# Patient Record
Sex: FEMALE | Race: WHITE
[De-identification: names, ages, dates, MRNs, and addresses within clinical notes are randomized per-mention and may not be internally consistent; named-entity substitution may affect disease eponyms.]

---

## 2019-12-27 ENCOUNTER — HOSPITAL ENCOUNTER (OUTPATIENT)
Dept: HOSPITAL 35 - CV | Age: 74
End: 2019-12-27
Payer: COMMERCIAL

## 2019-12-27 DIAGNOSIS — Q25.46: ICD-10-CM

## 2019-12-27 DIAGNOSIS — R06.02: Primary | ICD-10-CM

## 2019-12-27 DIAGNOSIS — I77.810: ICD-10-CM

## 2019-12-27 DIAGNOSIS — R06.00: ICD-10-CM

## 2019-12-27 NOTE — EXE
[facility]
[address]
[place]  [address]
Phone:  [phone]                    STRESS ECHOCARDIOGRAM         
_______________________________________________________________________________
 
Name:            MILLA GILBERT               Room #:                    [facility]#:           [id number]          Account #:     [id number]  
Admission:       12/27/19         Attend Phys:   Waylon Escobar, 
Discharge:                  Date of Birth: [birth year]  
                         Report #:      [id number]
        [id number]
_______________________________________________________________________________
THIS REPORT FOR:   //name//                          
 
 
--------------- APPROVED REPORT --------------
 
 
Study performed:  12/27/2019 11:08:08
 
Exam:  Stress Echocardiogram
Indication: Dyspnea
Patient Location: Out-Patient
Stress Nurse: Milla Gregorio RN
Status: routine
 
Ht: 5 ft 3 in      
HR: 52 bpm       BP: 138/74 mmHg 
Rhythm: NSR    
 
Medical History
Medications: No cardia meds
Allergies: No known drug allergies
 
Procedure
The patient underwent an Exercise Stress Test using the Del 
Protocol. Blood pressure, heart rate, and EKG were monitored.
An Echocardiogram was performed by technician in four stages in quad 
fashion.  At peak stress, four selected images were obtained and 
placed side by side with resting images for comparison.
 
Stress Test Details
Stress Test:  Exercise stress testing was performed using a Del 
protocol.      
HR           
Resting HR:            52 bpm   Max Heart Rate (APMHR): 147 bpm  
Max HR Achieved:  153 bpm   Target HR (85% APMHR): 124 bpm  
% of APMHR:         104         
Recovery HR:            65 bpm        
HR response to stress: Normal HR response to stress      
 
BP           
Resting BP:  138/74 mmHg        
Max BP:       146/72 mmHg        
Recovery BP:       126/70 mmHg        
BP response to stress: Normal blood pressure response to 
stress.      
ECG           
 
 
[address]  [address]
Phone:  [phone]                    STRESS ECHOCARDIOGRAM         
_______________________________________________________________________________
 
Name:            MILLA GILBERT FRANCISCO               Room #:                    [facility]#:           [id number]          Account #:     [id number]  
Admission:       12/27/19         Attend Phys:   Waylon Escobar, 
Discharge:                  Date of Birth: [birth year]  
                         Report #:      [id number]
        [id number]
_______________________________________________________________________________
Resting ECG:  Sinus Rhythm        
Stress ECG:     Sinus Tachycardia       
Recovery ECG: Sinus Rhythm        
 
Clinical
Reason for Termination: Maximal effort
Stress Symptoms: Dyspnea
Exercise duration: 8 min 43 sec
Highest Stage Achieved: Stage 3: 3.4 mph at 14% grade. 
Exercise capacity: 10.40 METs
 
Stress ECG Conclusion
1. subjectively negative for ischemia
2. electrocardiographically negative for ischemia
3. average functional capacity
 
Pre-Stress Echo
The resting Echocardiogram showed normal left ventricular 
contractility with an estimated Ejection Fraction of about 55-60%. 
The resting echocardiogram demonstrated normal wall motion in all 
wall segments.  
 
Post-Stress Echo
The stress Echocardiogram showed normal left ventricular 
contractility with an estimated Ejection Fraction of about 65-70%. 
Compared to rest, there were no stress-induced wall motion 
abnormalities. 
 
Conclusion
Clinical Response:  Non-ischemic
Exercise Capacity:  Average
Stress ECG Response:  Non-ischemic
Stress Echo Images:  Non-ischemic
1. low risk study
 
 
Other Information
Study Quality: Fair
Technically limited study due to breast 
implants.
 
 
 
[address] [place]  [address]
Phone:  [phone]                    STRESS ECHOCARDIOGRAM         
_______________________________________________________________________________
 
Name:            MILLA GILBERT               Room #:                    [facility]#:           [id number]          Account #:     [id number]  
Admission:       12/27/19         Attend Phys:   Waylon Escobar, 
Discharge:                  Date of Birth: [birth year]  
                         Report #:      [id number]
        [id number]
_______________________________________________________________________________
<Conclusion>
1. low risk study
 
 
 
 
 
 
 
 
 
 
 
 
 
 
 
 
 
 
 
 
 
 
 
 
 
 
 
 
 
 
 
 
 
 
 
 
 
 
 
 
 
 
  <ELECTRONICALLY SIGNED>
   By: Ezra Francisco MD    
  12/27/19     1534
D: 12/27/19 1534                           _____________________________________
T: 12/27/19 1534                           Ezra Francisco MD      /INF

## 2021-09-01 ENCOUNTER — HOSPITAL ENCOUNTER (OUTPATIENT)
Dept: HOSPITAL 35 - HYPER | Age: 76
End: 2021-09-01
Attending: PREVENTIVE MEDICINE
Payer: COMMERCIAL

## 2021-09-01 DIAGNOSIS — R23.3: ICD-10-CM

## 2021-09-01 DIAGNOSIS — S81.811A: Primary | ICD-10-CM

## 2021-09-01 DIAGNOSIS — G43.909: ICD-10-CM

## 2021-09-01 DIAGNOSIS — F41.9: ICD-10-CM
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
## 2021-09-01 DIAGNOSIS — L97.812: ICD-10-CM
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
## 2021-09-01 DIAGNOSIS — I83.811: ICD-10-CM

## 2021-09-01 DIAGNOSIS — L03.116: ICD-10-CM
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
## 2021-09-01 DIAGNOSIS — Z85.3: ICD-10-CM

## 2021-09-01 DIAGNOSIS — I83.028: ICD-10-CM

## 2021-09-09 ENCOUNTER — HOSPITAL ENCOUNTER (OUTPATIENT)
Dept: HOSPITAL 35 - HYPER | Age: 76
End: 2021-09-09
Attending: PREVENTIVE MEDICINE
Payer: COMMERCIAL

## 2021-09-09 DIAGNOSIS — Z85.3: ICD-10-CM

## 2021-09-09 DIAGNOSIS — L97.812: ICD-10-CM

## 2021-09-09 DIAGNOSIS — Z79.01: ICD-10-CM
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
## 2021-09-09 DIAGNOSIS — R23.3: ICD-10-CM

## 2021-09-09 DIAGNOSIS — L03.116: ICD-10-CM

## 2021-09-09 DIAGNOSIS — I83.812: ICD-10-CM

## 2021-09-09 DIAGNOSIS — F41.9: ICD-10-CM

## 2021-09-09 DIAGNOSIS — G43.909: ICD-10-CM

## 2021-09-09 DIAGNOSIS — Z79.899: ICD-10-CM

## 2021-09-09 DIAGNOSIS — I83.018: Primary | ICD-10-CM

## 2021-09-09 DIAGNOSIS — S81.811D: ICD-10-CM

## 2021-09-15 ENCOUNTER — HOSPITAL ENCOUNTER (OUTPATIENT)
Dept: HOSPITAL 35 - HYPER | Age: 76
End: 2021-09-15
Attending: PREVENTIVE MEDICINE
Payer: COMMERCIAL

## 2021-09-15 DIAGNOSIS — I83.018: Primary | ICD-10-CM

## 2021-09-15 DIAGNOSIS — F41.9: ICD-10-CM

## 2021-09-15 DIAGNOSIS — S81.811D: ICD-10-CM
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
## 2021-09-15 DIAGNOSIS — G43.909: ICD-10-CM

## 2021-09-15 DIAGNOSIS — L97.812: ICD-10-CM

## 2021-09-15 DIAGNOSIS — R23.3: ICD-10-CM

## 2021-09-15 DIAGNOSIS — Z85.3: ICD-10-CM

## 2021-09-15 DIAGNOSIS — L03.116: ICD-10-CM

## 2021-09-15 DIAGNOSIS — I83.812: ICD-10-CM

## 2021-09-15 DIAGNOSIS — Z79.01: ICD-10-CM
